# Patient Record
Sex: MALE | URBAN - METROPOLITAN AREA
[De-identification: names, ages, dates, MRNs, and addresses within clinical notes are randomized per-mention and may not be internally consistent; named-entity substitution may affect disease eponyms.]

---

## 2023-12-14 ENCOUNTER — APPOINTMENT (RX ONLY)
Dept: URBAN - METROPOLITAN AREA CLINIC 129 | Facility: CLINIC | Age: 78
Setting detail: DERMATOLOGY
End: 2023-12-14

## 2023-12-14 DIAGNOSIS — L30.0 NUMMULAR DERMATITIS: ICD-10-CM

## 2023-12-14 DIAGNOSIS — L28.0 LICHEN SIMPLEX CHRONICUS: ICD-10-CM

## 2023-12-14 PROCEDURE — ? COUNSELING

## 2023-12-14 PROCEDURE — 99204 OFFICE O/P NEW MOD 45 MIN: CPT

## 2023-12-14 PROCEDURE — ? TREATMENT REGIMEN

## 2023-12-14 PROCEDURE — ? PRESCRIPTION

## 2023-12-14 RX ORDER — TACROLIMUS 1 MG/G
OINTMENT TOPICAL
Qty: 60 | Refills: 2 | Status: ERX | COMMUNITY
Start: 2023-12-14

## 2023-12-14 RX ADMIN — TACROLIMUS: 1 OINTMENT TOPICAL at 00:00

## 2023-12-14 ASSESSMENT — LOCATION SIMPLE DESCRIPTION DERM
LOCATION SIMPLE: LEFT CALF
LOCATION SIMPLE: RIGHT CALF

## 2023-12-14 ASSESSMENT — LOCATION DETAILED DESCRIPTION DERM
LOCATION DETAILED: RIGHT DISTAL LATERAL CALF
LOCATION DETAILED: LEFT DISTAL LATERAL CALF

## 2023-12-14 ASSESSMENT — LOCATION ZONE DERM: LOCATION ZONE: LEG

## 2023-12-14 NOTE — PROCEDURE: TREATMENT REGIMEN
Initiate Treatment: Tacrolimus to be applied during steroid breaks.
Detail Level: Zone
Continue Regimen: Triamcinolone prescribed by pcp, use BID for 2 weeks, then must take a 2 week break. May repeat as needed. Written instruction provided to patient.
Plan: ILK if no improvement at follow up. Patient deferred injections today.
Initiate Treatment: Tacrolimus

## 2023-12-14 NOTE — HPI: ECZEMA (PATIENT REPORTED)
Called Irene back regarding call she made- left  stating patient had a [employer] Embolization Device placed on 8/17/23. I also fax the IR Angio report, if they need it. Left our clinic number to call us back with any questions or concerns.  
Where Is Your Eczema Located?: Left and right lower leg
List Prescription Topical Steroids You Are Currently Using (Separate Each Name With A Comma):: Triamcinolone 0.1%

## 2024-01-30 ENCOUNTER — APPOINTMENT (RX ONLY)
Dept: URBAN - METROPOLITAN AREA CLINIC 132 | Facility: CLINIC | Age: 79
Setting detail: DERMATOLOGY
End: 2024-01-30

## 2024-01-30 DIAGNOSIS — L28.0 LICHEN SIMPLEX CHRONICUS: ICD-10-CM

## 2024-01-30 DIAGNOSIS — B35.1 TINEA UNGUIUM: ICD-10-CM

## 2024-01-30 PROCEDURE — 99213 OFFICE O/P EST LOW 20 MIN: CPT | Mod: 25

## 2024-01-30 PROCEDURE — ? ADDITIONAL NOTES

## 2024-01-30 PROCEDURE — ? INTRALESIONAL KENALOG

## 2024-01-30 PROCEDURE — 11900 INJECT SKIN LESIONS </W 7: CPT

## 2024-01-30 PROCEDURE — ? COUNSELING

## 2024-01-30 PROCEDURE — ? PRESCRIPTION

## 2024-01-30 RX ORDER — CLOBETASOL PROPIONATE 0.5 MG/G
CREAM TOPICAL
Qty: 60 | Refills: 1 | Status: CANCELLED | COMMUNITY
Start: 2024-01-30

## 2024-01-30 RX ADMIN — CLOBETASOL PROPIONATE: 0.5 CREAM TOPICAL at 00:00

## 2024-01-30 ASSESSMENT — LOCATION SIMPLE DESCRIPTION DERM
LOCATION SIMPLE: LEFT GREAT TOE
LOCATION SIMPLE: RIGHT 3RD TOE
LOCATION SIMPLE: LEFT PRETIBIAL REGION
LOCATION SIMPLE: LEFT CALF
LOCATION SIMPLE: RIGHT 2ND TOE
LOCATION SIMPLE: RIGHT GREAT TOE
LOCATION SIMPLE: LEFT 2ND TOE
LOCATION SIMPLE: LEFT 3RD TOE
LOCATION SIMPLE: LEFT 4TH TOE

## 2024-01-30 ASSESSMENT — LOCATION DETAILED DESCRIPTION DERM
LOCATION DETAILED: RIGHT 3RD TOENAIL
LOCATION DETAILED: LEFT DISTAL LATERAL CALF
LOCATION DETAILED: LEFT LATERAL DISTAL PRETIBIAL REGION
LOCATION DETAILED: RIGHT DISTAL PLANTAR GREAT TOE
LOCATION DETAILED: RIGHT 2ND TOENAIL
LOCATION DETAILED: LEFT 4TH TOENAIL
LOCATION DETAILED: LEFT GREAT TOENAIL
LOCATION DETAILED: LEFT 3RD TOENAIL
LOCATION DETAILED: LEFT 2ND TOENAIL

## 2024-01-30 ASSESSMENT — LOCATION ZONE DERM
LOCATION ZONE: LEG
LOCATION ZONE: TOENAIL
LOCATION ZONE: TOE

## 2024-01-30 NOTE — PROCEDURE: INTRALESIONAL KENALOG
Show Inventory Tab: Hide
Detail Level: Detailed
Concentration Of Kenalog Solution Injected (Mg/Ml): 5.0
Medical Necessity Clause: This procedure was medically necessary because the lesions that were treated were:
Total Volume (Ccs): 2
Include Z78.9 (Other Specified Conditions Influencing Health Status) As An Associated Diagnosis?: No
X Size Of Lesion In Cm (Optional): 0
Validate Note Data When Using Inventory: Yes
Kenalog Type Of Vial: Multiple Dose
Consent: The risks of atrophy were reviewed with the patient.
Kenalog Preparation: Kenalog

## 2024-01-30 NOTE — HPI: FOLLOW-UP
What Is The Condition That You Are Returning For Follow-Up?: other
Additional History: On last visit was prescribed tacrolimus ointment and instructed to continue triamcinolone.  Reports itching continues

## 2024-01-30 NOTE — PROCEDURE: ADDITIONAL NOTES
Render Risk Assessment In Note?: no
Detail Level: Simple
Additional Notes: Patient deferred oral treatment today
Additional Notes: Patient deferred hydroxyzine prescription

## 2024-01-31 ENCOUNTER — RX ONLY (OUTPATIENT)
Age: 79
Setting detail: RX ONLY
End: 2024-01-31

## 2024-01-31 RX ORDER — CLOBETASOL PROPIONATE 0.5 MG/G
CREAM TOPICAL
Qty: 60 | Refills: 1 | Status: ERX

## 2024-07-16 ENCOUNTER — APPOINTMENT (RX ONLY)
Dept: URBAN - METROPOLITAN AREA CLINIC 132 | Facility: CLINIC | Age: 79
Setting detail: DERMATOLOGY
End: 2024-07-16

## 2024-07-16 DIAGNOSIS — L81.0 POSTINFLAMMATORY HYPERPIGMENTATION: ICD-10-CM

## 2024-07-16 DIAGNOSIS — L28.0 LICHEN SIMPLEX CHRONICUS: ICD-10-CM | Status: RESOLVED

## 2024-07-16 PROCEDURE — 99213 OFFICE O/P EST LOW 20 MIN: CPT

## 2024-07-16 PROCEDURE — ? COUNSELING

## 2024-07-16 ASSESSMENT — LOCATION DETAILED DESCRIPTION DERM
LOCATION DETAILED: RIGHT DISTAL LATERAL CALF
LOCATION DETAILED: LEFT DISTAL CALF
LOCATION DETAILED: LEFT DISTAL LATERAL CALF

## 2024-07-16 ASSESSMENT — LOCATION SIMPLE DESCRIPTION DERM
LOCATION SIMPLE: LEFT CALF
LOCATION SIMPLE: RIGHT CALF

## 2024-07-16 ASSESSMENT — LOCATION ZONE DERM: LOCATION ZONE: LEG

## 2024-07-16 NOTE — HPI: FOLLOW-UP
What Is The Condition That You Are Returning For Follow-Up?: other
Additional History: On last visit was treated with kenalog 5 mg/ml injections and prescribed clobetasol topical cream

## 2025-07-08 ENCOUNTER — APPOINTMENT (OUTPATIENT)
Dept: URBAN - METROPOLITAN AREA CLINIC 129 | Facility: CLINIC | Age: 80
Setting detail: DERMATOLOGY
End: 2025-07-08

## 2025-07-08 DIAGNOSIS — L28.0 LICHEN SIMPLEX CHRONICUS: ICD-10-CM

## 2025-07-08 DIAGNOSIS — L29.89 OTHER PRURITUS: ICD-10-CM

## 2025-07-08 DIAGNOSIS — L91.8 OTHER HYPERTROPHIC DISORDERS OF THE SKIN: ICD-10-CM

## 2025-07-08 PROCEDURE — ? INTRALESIONAL KENALOG

## 2025-07-08 PROCEDURE — ? ADDITIONAL NOTES

## 2025-07-08 PROCEDURE — ? COUNSELING

## 2025-07-08 PROCEDURE — ? PRESCRIPTION

## 2025-07-08 RX ORDER — CLOBETASOL PROPIONATE CREAM USP, 0.05% 0.5 MG/G
CREAM TOPICAL
Qty: 60 | Refills: 1 | Status: ERX | COMMUNITY
Start: 2025-07-08

## 2025-07-08 RX ADMIN — CLOBETASOL PROPIONATE CREAM USP, 0.05%: 0.5 CREAM TOPICAL at 00:00

## 2025-07-08 ASSESSMENT — LOCATION SIMPLE DESCRIPTION DERM
LOCATION SIMPLE: LEFT PRETIBIAL REGION
LOCATION SIMPLE: LEFT CALF
LOCATION SIMPLE: RIGHT PRETIBIAL REGION
LOCATION SIMPLE: RIGHT PRETIBIAL REGION
LOCATION SIMPLE: LEFT PRETIBIAL REGION
LOCATION SIMPLE: LEFT SUPERIOR EYELID

## 2025-07-08 ASSESSMENT — LOCATION DETAILED DESCRIPTION DERM
LOCATION DETAILED: RIGHT DISTAL PRETIBIAL REGION
LOCATION DETAILED: LEFT DISTAL LATERAL CALF
LOCATION DETAILED: LEFT PROXIMAL PRETIBIAL REGION
LOCATION DETAILED: LEFT LATERAL SUPERIOR EYELID
LOCATION DETAILED: LEFT DISTAL PRETIBIAL REGION
LOCATION DETAILED: RIGHT DISTAL PRETIBIAL REGION
LOCATION DETAILED: RIGHT PROXIMAL PRETIBIAL REGION

## 2025-07-08 ASSESSMENT — LOCATION ZONE DERM
LOCATION ZONE: LEG
LOCATION ZONE: LEG
LOCATION ZONE: EYELID

## 2025-07-08 NOTE — PROCEDURE: ADDITIONAL NOTES
Detail Level: Simple
Render Risk Assessment In Note?: no
Additional Notes: Quoted $218.00 for a cosmetic removal

## 2025-07-08 NOTE — PROCEDURE: INTRALESIONAL KENALOG
Consent: The risks of atrophy were reviewed with the patient.
How Many Mls Were Removed From The 40 Mg/Ml (10ml) Vial When Preparing The Injectable Solution?: 0
Show Inventory Tab: Hide
Bill For Wasted Drug (Kenalog)?: no
Validate Note Data When Using Inventory: Yes
Kenalog Type Of Vial: Multiple Dose
Detail Level: Detailed
Kenalog Preparation: Kenalog
Medical Necessity Clause: This procedure was medically necessary because the lesions that were treated were:
Total Volume (Ccs): 2.0
Concentration Of Kenalog Solution Injected (Mg/Ml): 5.0